# Patient Record
Sex: FEMALE | Race: WHITE | NOT HISPANIC OR LATINO
[De-identification: names, ages, dates, MRNs, and addresses within clinical notes are randomized per-mention and may not be internally consistent; named-entity substitution may affect disease eponyms.]

---

## 2017-09-13 ENCOUNTER — CHARTING TRANS (OUTPATIENT)
Dept: OTHER | Age: 22
End: 2017-09-13

## 2017-09-15 ENCOUNTER — CHARTING TRANS (OUTPATIENT)
Dept: OTHER | Age: 22
End: 2017-09-15

## 2017-09-25 ENCOUNTER — CHARTING TRANS (OUTPATIENT)
Dept: OTHER | Age: 22
End: 2017-09-25

## 2017-09-27 ENCOUNTER — CHARTING TRANS (OUTPATIENT)
Dept: OTHER | Age: 22
End: 2017-09-27

## 2017-10-06 PROBLEM — Z02.89 ENCOUNTER FOR PHYSICAL EXAMINATION RELATED TO EMPLOYMENT: Status: ACTIVE | Noted: 2017-10-06

## 2018-04-30 ENCOUNTER — OFFICE VISIT (OUTPATIENT)
Dept: OBGYN CLINIC | Facility: CLINIC | Age: 23
End: 2018-04-30

## 2018-04-30 VITALS
HEART RATE: 100 BPM | RESPIRATION RATE: 20 BRPM | BODY MASS INDEX: 36.26 KG/M2 | DIASTOLIC BLOOD PRESSURE: 88 MMHG | SYSTOLIC BLOOD PRESSURE: 134 MMHG | TEMPERATURE: 98 F | HEIGHT: 64.25 IN | WEIGHT: 212.38 LBS

## 2018-04-30 DIAGNOSIS — R87.610 ASCUS WITH POSITIVE HIGH RISK HPV CERVICAL: ICD-10-CM

## 2018-04-30 DIAGNOSIS — R87.810 ASCUS WITH POSITIVE HIGH RISK HPV CERVICAL: ICD-10-CM

## 2018-04-30 DIAGNOSIS — Z01.419 ENCOUNTER FOR ANNUAL ROUTINE GYNECOLOGICAL EXAMINATION: Primary | ICD-10-CM

## 2018-04-30 DIAGNOSIS — Z12.4 SCREENING FOR MALIGNANT NEOPLASM OF CERVIX: ICD-10-CM

## 2018-04-30 PROCEDURE — 99385 PREV VISIT NEW AGE 18-39: CPT | Performed by: OBSTETRICS & GYNECOLOGY

## 2018-04-30 PROCEDURE — 88175 CYTOPATH C/V AUTO FLUID REDO: CPT | Performed by: OBSTETRICS & GYNECOLOGY

## 2018-04-30 PROCEDURE — 87624 HPV HI-RISK TYP POOLED RSLT: CPT | Performed by: OBSTETRICS & GYNECOLOGY

## 2018-04-30 RX ORDER — ACETAMINOPHEN AND CODEINE PHOSPHATE 120; 12 MG/5ML; MG/5ML
0.35 SOLUTION ORAL DAILY
Qty: 1 PACKAGE | Refills: 11 | Status: SHIPPED | OUTPATIENT
Start: 2018-04-30 | End: 2018-05-28 | Stop reason: WASHOUT

## 2018-04-30 NOTE — PROGRESS NOTES
HPI:   Emilee Ann is a 21[facility] who presents for an annual gynecological exam.    Menses: Patient's last menstrual period was 04/22/2018 (exact date).  Cycle length: 28 days Flow:  nl   stop birth control pills 2-3 months ago due to running ou denies allergy symptoms    EXAM:     VITALS: /88 (BP Location: Right arm, Patient Position: Sitting, Cuff Size: large)   Pulse 100   Temp 97.9 °F (36.6 °C) (Oral)   Resp 20   Ht 64.25\"   Wt 212 lb 6.4 oz   LMP 04/22/2018 (Exact Date)   Breastfeeding with each sexual encounter  · We discussed prevention of STD's. · We discussed the HPV vaccine including the risks and benefits. · I encouraged smoking cessation, if indicated.   · I encouraged pt to have yearly annual examinations with her PCP for manage

## 2018-09-17 ENCOUNTER — CHARTING TRANS (OUTPATIENT)
Dept: OTHER | Age: 23
End: 2018-09-17

## 2018-09-19 ENCOUNTER — CHARTING TRANS (OUTPATIENT)
Dept: OTHER | Age: 23
End: 2018-09-19

## 2019-03-29 RX ORDER — ACETAMINOPHEN AND CODEINE PHOSPHATE 120; 12 MG/5ML; MG/5ML
0.35 SOLUTION ORAL DAILY
Qty: 1 PACKAGE | Refills: 1 | Status: SHIPPED | OUTPATIENT
Start: 2019-03-29 | End: 2019-04-26 | Stop reason: WASHOUT

## 2019-03-29 NOTE — TELEPHONE ENCOUNTER
Pt contacted to reschedule 4/8 appointment for yearly physical due to it being too soon for insurance to cover. Pt wants a refill for [facility] seeing that she will be running out and cant come in until May.

## 2019-03-29 NOTE — TELEPHONE ENCOUNTER
PC with patient. Needs refill on ocp. Physical due after 4/30/2019. States she checks her Bp periodically at home and she states it has been ok. Physical scheduled for 5/6/2019.

## 2019-04-26 ENCOUNTER — OFFICE VISIT (OUTPATIENT)
Dept: OTHER | Facility: HOSPITAL | Age: 24
End: 2019-04-26
Attending: PREVENTIVE MEDICINE

## 2019-04-26 DIAGNOSIS — Z00.00 ANNUAL PHYSICAL EXAM: Primary | ICD-10-CM

## 2019-04-26 PROCEDURE — 86480 TB TEST CELL IMMUN MEASURE: CPT

## 2019-05-06 ENCOUNTER — OFFICE VISIT (OUTPATIENT)
Dept: OBGYN CLINIC | Facility: CLINIC | Age: 24
End: 2019-05-06
Payer: COMMERCIAL

## 2019-05-06 VITALS
RESPIRATION RATE: 12 BRPM | BODY MASS INDEX: 36.37 KG/M2 | SYSTOLIC BLOOD PRESSURE: 120 MMHG | TEMPERATURE: 98 F | DIASTOLIC BLOOD PRESSURE: 80 MMHG | HEIGHT: 64 IN | WEIGHT: 213 LBS | HEART RATE: 84 BPM

## 2019-05-06 DIAGNOSIS — Z12.4 SCREENING FOR MALIGNANT NEOPLASM OF THE CERVIX: ICD-10-CM

## 2019-05-06 DIAGNOSIS — Z01.419 ENCOUNTER FOR ANNUAL ROUTINE GYNECOLOGICAL EXAMINATION: Primary | ICD-10-CM

## 2019-05-06 PROCEDURE — 99395 PREV VISIT EST AGE 18-39: CPT | Performed by: OBSTETRICS & GYNECOLOGY

## 2019-05-06 PROCEDURE — 88175 CYTOPATH C/V AUTO FLUID REDO: CPT | Performed by: OBSTETRICS & GYNECOLOGY

## 2019-05-06 RX ORDER — ACETAMINOPHEN AND CODEINE PHOSPHATE 120; 12 MG/5ML; MG/5ML
0.35 SOLUTION ORAL DAILY
Qty: 3 PACKAGE | Refills: 3 | Status: SHIPPED | OUTPATIENT
Start: 2019-05-06 | End: 2020-04-06

## 2019-05-06 RX ORDER — ACETAMINOPHEN AND CODEINE PHOSPHATE 120; 12 MG/5ML; MG/5ML
0.35 SOLUTION ORAL
COMMUNITY
End: 2019-05-06

## 2019-05-06 NOTE — PROGRESS NOTES
HPI:   Yumiko Rodriguez is a 25year old  who presents for an annual gynecological exam.    Menses: Patient's last menstrual period was 2019 (approximate). Cycle length: 28 days Flow: light   on [place].   Pap that was done 2018 was AS Size: adult)   Pulse 84   Temp 98.1 °F (36.7 °C) (Oral)   Resp 12   Ht 64\"   Wt 213 lb   LMP 05/01/2019 (Approximate)   Breastfeeding?  No   BMI 36.56 kg/m²   GENERAL:  Well-appearing, no apparent distress, well nourished, well developed  SKIN: Normal, no benefits. · I encouraged smoking cessation, if indicated. · I encouraged pt to have yearly annual examinations with her PCP for management of general medical problems.     Pt voiced understanding of all instructions; all questions answered; no barriers to

## 2019-05-09 RX ORDER — METRONIDAZOLE 500 MG/1
500 TABLET ORAL 2 TIMES DAILY
Qty: 14 TABLET | Refills: 0 | Status: SHIPPED | OUTPATIENT
Start: 2019-05-09 | End: 2019-05-16

## 2020-04-06 RX ORDER — ACETAMINOPHEN AND CODEINE PHOSPHATE 120; 12 MG/5ML; MG/5ML
SOLUTION ORAL
Qty: 84 TABLET | Refills: 0 | Status: SHIPPED | OUTPATIENT
Start: 2020-04-06 | End: 2020-06-26

## 2020-06-16 ENCOUNTER — LAB REQUISITION (OUTPATIENT)
Dept: LAB | Facility: HOSPITAL | Age: 25
End: 2020-06-16
Payer: COMMERCIAL

## 2020-06-16 DIAGNOSIS — Z11.59 ENCOUNTER FOR SCREENING FOR OTHER VIRAL DISEASES: ICD-10-CM

## 2020-06-26 RX ORDER — ACETAMINOPHEN AND CODEINE PHOSPHATE 120; 12 MG/5ML; MG/5ML
SOLUTION ORAL
Qty: 84 TABLET | Refills: 0 | Status: SHIPPED | OUTPATIENT
Start: 2020-06-26 | End: 2020-07-25

## 2020-07-25 ENCOUNTER — OFFICE VISIT (OUTPATIENT)
Dept: OBGYN CLINIC | Facility: CLINIC | Age: 25
End: 2020-07-25
Payer: COMMERCIAL

## 2020-07-25 VITALS
BODY MASS INDEX: 37.73 KG/M2 | TEMPERATURE: 99 F | HEART RATE: 80 BPM | SYSTOLIC BLOOD PRESSURE: 130 MMHG | DIASTOLIC BLOOD PRESSURE: 80 MMHG | HEIGHT: 64 IN | RESPIRATION RATE: 12 BRPM | WEIGHT: 221 LBS

## 2020-07-25 DIAGNOSIS — Z01.419 ENCOUNTER FOR ANNUAL ROUTINE GYNECOLOGICAL EXAMINATION: Primary | ICD-10-CM

## 2020-07-25 DIAGNOSIS — Z12.4 SCREENING FOR MALIGNANT NEOPLASM OF THE CERVIX: ICD-10-CM

## 2020-07-25 PROCEDURE — 3075F SYST BP GE 130 - 139MM HG: CPT | Performed by: OBSTETRICS & GYNECOLOGY

## 2020-07-25 PROCEDURE — 3008F BODY MASS INDEX DOCD: CPT | Performed by: OBSTETRICS & GYNECOLOGY

## 2020-07-25 PROCEDURE — 3079F DIAST BP 80-89 MM HG: CPT | Performed by: OBSTETRICS & GYNECOLOGY

## 2020-07-25 PROCEDURE — 88175 CYTOPATH C/V AUTO FLUID REDO: CPT | Performed by: OBSTETRICS & GYNECOLOGY

## 2020-07-25 PROCEDURE — 99395 PREV VISIT EST AGE 18-39: CPT | Performed by: OBSTETRICS & GYNECOLOGY

## 2020-07-25 RX ORDER — ACETAMINOPHEN AND CODEINE PHOSPHATE 120; 12 MG/5ML; MG/5ML
0.35 SOLUTION ORAL DAILY
Qty: 84 TABLET | Refills: 3 | Status: SHIPPED | OUTPATIENT
Start: 2020-07-25 | End: 2021-09-04

## 2020-07-25 NOTE — PROGRESS NOTES
HPI:   Damari Alon[facility] 22[facility] for an annual [facility]ological exam.    Menses: Patient's last menstrual period was 07/11/2020 (exact date).  Cycle length: 28 days Flow: nl  POP  Current Outpatient Medications   Medication Sig Dispen BMI 37.93 kg/m²   GENERAL:  Well-appearing, no apparent distress, well nourished, well developed  SKIN: Normal, no rashes, no acne, no hirsutism, no ulcers  HEENT: Atraumatic, normocephalic, throat is clear  NECK: No masses, symmetric  THYROID: No masses general medical problems. Pt voiced understanding of all instructions; all questions answered; no barriers to learning.       ASSESSMENT AND PLAN:   Mariah Welch is a 22year old female who presents for an annual gynecological exam.        Normal exam.

## 2020-07-25 NOTE — PROGRESS NOTES
Pt here for pe/pap.   LMP:7/11/20  Last pap:5/6/19 negative   Last mammogram;N/A  Birth control:OCP's  She needs OCP refill

## 2020-08-19 ENCOUNTER — LAB REQUISITION (OUTPATIENT)
Dept: LAB | Facility: HOSPITAL | Age: 25
End: 2020-08-19
Payer: COMMERCIAL

## 2020-08-19 DIAGNOSIS — Z11.59 ENCOUNTER FOR SCREENING FOR OTHER VIRAL DISEASES: ICD-10-CM

## 2020-08-22 LAB — SARS-COV-2 BY PCR: NOT DETECTED

## 2020-08-25 ENCOUNTER — LAB REQUISITION (OUTPATIENT)
Dept: LAB | Facility: HOSPITAL | Age: 25
End: 2020-08-25
Payer: COMMERCIAL

## 2020-08-25 DIAGNOSIS — Z11.59 ENCOUNTER FOR SCREENING FOR OTHER VIRAL DISEASES: ICD-10-CM

## 2020-08-28 LAB — SARS-COV-2 BY PCR: NOT DETECTED

## 2020-09-02 ENCOUNTER — LAB REQUISITION (OUTPATIENT)
Dept: LAB | Facility: HOSPITAL | Age: 25
End: 2020-09-02
Payer: COMMERCIAL

## 2020-09-02 DIAGNOSIS — Z11.59 ENCOUNTER FOR SCREENING FOR OTHER VIRAL DISEASES: ICD-10-CM

## 2020-09-04 LAB — SARS-COV-2 BY PCR: NOT DETECTED

## 2021-08-16 RX ORDER — ACETAMINOPHEN AND CODEINE PHOSPHATE 120; 12 MG/5ML; MG/5ML
SOLUTION ORAL
Qty: 84 TABLET | Refills: 3 | OUTPATIENT
Start: 2021-08-16

## 2021-09-04 ENCOUNTER — OFFICE VISIT (OUTPATIENT)
Dept: OBGYN CLINIC | Facility: CLINIC | Age: 26
End: 2021-09-04
Payer: COMMERCIAL

## 2021-09-04 VITALS
WEIGHT: 215.81 LBS | DIASTOLIC BLOOD PRESSURE: 80 MMHG | HEIGHT: 64 IN | SYSTOLIC BLOOD PRESSURE: 118 MMHG | BODY MASS INDEX: 36.84 KG/M2 | HEART RATE: 92 BPM

## 2021-09-04 DIAGNOSIS — Z01.419 ENCOUNTER FOR ANNUAL ROUTINE GYNECOLOGICAL EXAMINATION: Primary | ICD-10-CM

## 2021-09-04 PROCEDURE — 3074F SYST BP LT 130 MM HG: CPT | Performed by: OBSTETRICS & GYNECOLOGY

## 2021-09-04 PROCEDURE — 88175 CYTOPATH C/V AUTO FLUID REDO: CPT | Performed by: OBSTETRICS & GYNECOLOGY

## 2021-09-04 PROCEDURE — 3008F BODY MASS INDEX DOCD: CPT | Performed by: OBSTETRICS & GYNECOLOGY

## 2021-09-04 PROCEDURE — 3079F DIAST BP 80-89 MM HG: CPT | Performed by: OBSTETRICS & GYNECOLOGY

## 2021-09-04 PROCEDURE — 99395 PREV VISIT EST AGE 18-39: CPT | Performed by: OBSTETRICS & GYNECOLOGY

## 2021-09-04 RX ORDER — ACETAMINOPHEN AND CODEINE PHOSPHATE 120; 12 MG/5ML; MG/5ML
0.35 SOLUTION ORAL DAILY
Qty: 84 TABLET | Refills: 3 | Status: SHIPPED | OUTPATIENT
Start: 2021-09-04

## 2021-09-04 NOTE — PROGRESS NOTES
HPI:   Che Cabrales is a 32year old  who presents for an annual gynecological exam.    Menses: Patient's last menstrual period was 2021 (exact date).  Cycle length: 30 days Flow: nl  POP  Current Outpatient Medications   Medication Sig Dispen 37.04 kg/m²   GENERAL:  Well-appearing, no apparent distress, well nourished, well developed  SKIN: Normal, no rashes, no acne, no hirsutism, no ulcers  HEENT: Atraumatic, normocephalic, throat is clear  NECK: No masses, symmetric  THYROID: No masses, no t barriers to learning. ASSESSMENT AND PLAN:   Maikol Gomez is a 32year old female who presents for an annual gynecological exam.        Normal exam.  Pap done. Contraception: pill  RTO 1 year or PRN.     Diagnoses and all orders for this visit:    En

## 2022-08-04 RX ORDER — ACETAMINOPHEN AND CODEINE PHOSPHATE 120; 12 MG/5ML; MG/5ML
SOLUTION ORAL
Qty: 84 TABLET | Refills: 3 | OUTPATIENT
Start: 2022-08-04

## 2022-08-04 RX ORDER — ACETAMINOPHEN AND CODEINE PHOSPHATE 120; 12 MG/5ML; MG/5ML
0.35 SOLUTION ORAL DAILY
Qty: 84 TABLET | Refills: 0 | Status: SHIPPED | OUTPATIENT
Start: 2022-08-04 | End: 2022-09-07

## 2022-09-07 ENCOUNTER — OFFICE VISIT (OUTPATIENT)
Dept: OBGYN CLINIC | Facility: CLINIC | Age: 27
End: 2022-09-07
Payer: COMMERCIAL

## 2022-09-07 VITALS
DIASTOLIC BLOOD PRESSURE: 83 MMHG | SYSTOLIC BLOOD PRESSURE: 121 MMHG | HEIGHT: 64 IN | HEART RATE: 102 BPM | WEIGHT: 224 LBS | BODY MASS INDEX: 38.24 KG/M2

## 2022-09-07 DIAGNOSIS — Z01.419 ENCOUNTER FOR ANNUAL ROUTINE GYNECOLOGICAL EXAMINATION: Primary | ICD-10-CM

## 2022-09-07 PROCEDURE — 3074F SYST BP LT 130 MM HG: CPT | Performed by: OBSTETRICS & GYNECOLOGY

## 2022-09-07 PROCEDURE — 3008F BODY MASS INDEX DOCD: CPT | Performed by: OBSTETRICS & GYNECOLOGY

## 2022-09-07 PROCEDURE — 3079F DIAST BP 80-89 MM HG: CPT | Performed by: OBSTETRICS & GYNECOLOGY

## 2022-09-07 PROCEDURE — 99395 PREV VISIT EST AGE 18-39: CPT | Performed by: OBSTETRICS & GYNECOLOGY

## 2022-09-07 RX ORDER — ACETAMINOPHEN AND CODEINE PHOSPHATE 120; 12 MG/5ML; MG/5ML
0.35 SOLUTION ORAL DAILY
Qty: 84 TABLET | Refills: 3 | Status: SHIPPED | OUTPATIENT
Start: 2022-09-07

## 2023-09-25 ENCOUNTER — OFFICE VISIT (OUTPATIENT)
Dept: OBGYN CLINIC | Facility: CLINIC | Age: 28
End: 2023-09-25
Payer: COMMERCIAL

## 2023-09-25 VITALS
HEART RATE: 93 BPM | BODY MASS INDEX: 37.63 KG/M2 | HEIGHT: 64 IN | DIASTOLIC BLOOD PRESSURE: 90 MMHG | WEIGHT: 220.44 LBS | SYSTOLIC BLOOD PRESSURE: 126 MMHG

## 2023-09-25 DIAGNOSIS — Z11.3 ROUTINE SCREENING FOR STI (SEXUALLY TRANSMITTED INFECTION): ICD-10-CM

## 2023-09-25 DIAGNOSIS — Z30.41 USES ORAL CONTRACEPTION: ICD-10-CM

## 2023-09-25 DIAGNOSIS — Z23 NEED FOR VACCINATION: ICD-10-CM

## 2023-09-25 DIAGNOSIS — Z01.419 WELL WOMAN EXAM WITH ROUTINE GYNECOLOGICAL EXAM: Primary | ICD-10-CM

## 2023-09-25 PROCEDURE — 87491 CHLMYD TRACH DNA AMP PROBE: CPT

## 2023-09-25 PROCEDURE — 87591 N.GONORRHOEAE DNA AMP PROB: CPT

## 2023-09-25 RX ORDER — ACETAMINOPHEN AND CODEINE PHOSPHATE 120; 12 MG/5ML; MG/5ML
0.35 SOLUTION ORAL DAILY
Qty: 84 TABLET | Refills: 3 | Status: SHIPPED | OUTPATIENT
Start: 2023-09-25

## 2023-09-25 NOTE — PROGRESS NOTES
Mars Baxter is a 29year old female [address] Patient's last menstrual period was 2023 (exact date). Patient presents with:  Wellness Visit: Last pap smear was 21, negative   Contraception: Refill needed, pharmacy confirmed. Other: Patient said YES to student in the room   . BP recheck 122/84  OBSTETRICS HISTORY:  OB History    Para Term  AB Living   1       1 0   SAB IAB Ectopic Multiple Live Births           0      # Outcome Date GA Lbr Raffi/2nd Weight Sex Delivery Anes PTL Lv   1 AB 2013 9w0d              GYNE HISTORY:  Periods regular monthly    Sexual activity:   Yes      Partners:   Male      Birth control/ protection:   Condom, Pill, OCP        Hx Prior Abnormal Pap: No  Pap Date: 21  Pap Result Notes: negative        MEDICAL HISTORY:  Past Medical History:   Diagnosis Date    ASCUS with positive high risk HPV cervical 2016    ASCUS; hpv positive    COVID     10/2020    Human papilloma virus infection        SURGICAL HISTORY:  Past Surgical History:   Procedure Laterality Date    D & c      , 9wks       SOCIAL HISTORY:  Social History    Socioeconomic History      Marital status: Single      Spouse name: Not on file      Number of children: Not on file      Years of education: Not on file      Highest education level: Not on file    Occupational History      Not on file    Tobacco Use      Smoking status: Never      Smokeless tobacco: Never    Vaping Use      Vaping Use: Never used    Substance and Sexual Activity      Alcohol use:  Yes        Alcohol/week: 2.0 standard drinks of alcohol        Types: 2 Glasses of wine per week        Comment: occ drinkers      Drug use: Never      Sexual activity: Yes        Partners: Male        Birth control/protection: Condom, Pill, OCP    Other Topics      Concerns:         Service: Not Asked        Blood Transfusions: Not Asked        Caffeine Concern: Yes        Occupational Exposure: Not Asked        [employer] Hazards: Not Asked        Sleep Concern: Not Asked        Stress Concern: Not Asked        Weight Concern: Not Asked        Special Diet: Not Asked        Back Care: Not Asked        Exercise: Yes        Bike Helmet: Not Asked        Seat Belt: Yes        Self-Exams: Not Asked    Social History Narrative      Not on file    Social Determinants of Health  Financial Resource Strain: Not on file  Food Insecurity: Not on file  Transportation Needs: Not on file  Physical Activity: Not on file  Stress: Not on file  Social Connections: Not on file  Housing Stability: Not on file    FAMILY HISTORY:  Family History   Problem Relation Age of Onset    Other (Other) Mother 27        MS    Cancer Maternal Grandfather 79        adrenal    Cancer Paternal Grandmother 72        breast       MEDICATIONS:    Current Outpatient Medications:     Norethindrone 0.35 MG Oral Tab, Take 1 tablet (0.35 mg total) by mouth daily. , Disp: 84 tablet, Rfl: 3    ALLERGIES:    Penicillins             RASH      Review of Systems:  Constitutional:  Denies fatigue, night sweats, hot flashes  Eyes:  denies blurred or double vision  Cardiovascular:  denies chest pain or palpitations  Respiratory:  denies shortness of breath  Gastrointestinal:  denies heartburn, abdominal pain, diarrhea or constipation  Genitourinary:  denies dysuria, incontinence, abnormal vaginal discharge, vaginal itching  Musculoskeletal:  denies back pain. Skin/Breast:  Denies any breast pain, lumps, or discharge. Neurological:  denies headaches, extremity weakness or numbness. Psychiatric: denies depression or anxiety. Endocrine:   denies excessive thirst or urination. Heme/Lymph:  denies history of anemia, easy bruising or bleeding.       PHYSICAL EXAM:   Constitutional: well developed, well nourished  Head/Face: normocephalic  Neck/Thyroid: thyroid symmetric, no thyromegaly, no nodules, no adenopathy  Lymphatic:no abnormal supraclavicular or axillary adenopathy is noted  Breast: normal without palpable masses, tenderness, asymmetry, nipple discharge, nipple retraction or skin changes  Abdomen:  soft, nontender, nondistended, no masses  Skin/Hair: no unusual rashes or bruises  Extremities: no edema, no cyanosis  Psychiatric:  Oriented to time, place, person and situation. Appropriate mood and affect    Pelvic Exam:  External Genitalia: normal appearance, hair distribution, and no lesions  Urethral Meatus:  normal in size, location, without lesions and prolapse  Bladder:  No fullness, masses or tenderness  Vagina:  Normal appearance without lesions, no abnormal discharge  Cervix:  Normal without tenderness on motion  Uterus: normal in size, contour, position, mobility, without tenderness  Adnexa: normal without masses or tenderness  Perineum: normal  Anus: no hemorroids     Assessment & Plan:  Diagnoses and all orders for this visit:    Well woman exam with routine gynecological exam    Need for vaccination  -     INFLUENZA VACCINE, QUAD, PRESERVATIVE FREE, 0.5 ML    Uses oral contraception  -     Norethindrone 0.35 MG Oral Tab; Take 1 tablet (0.35 mg total) by mouth daily. Routine screening for STI (sexually transmitted infection)  -     Chlamydia/Gc Amplification;  Future

## 2023-09-26 LAB
C TRACH DNA SPEC QL NAA+PROBE: NEGATIVE
N GONORRHOEA DNA SPEC QL NAA+PROBE: NEGATIVE

## 2024-06-11 DIAGNOSIS — Z30.41 USES ORAL CONTRACEPTION: ICD-10-CM

## 2024-06-11 RX ORDER — ACETAMINOPHEN AND CODEINE PHOSPHATE 120; 12 MG/5ML; MG/5ML
0.35 SOLUTION ORAL DAILY
Qty: 84 TABLET | Refills: 0 | Status: SHIPPED | OUTPATIENT
Start: 2024-06-11

## 2024-08-27 DIAGNOSIS — Z30.41 USES ORAL CONTRACEPTION: ICD-10-CM

## 2024-08-27 RX ORDER — ACETAMINOPHEN AND CODEINE PHOSPHATE 120; 12 MG/5ML; MG/5ML
0.35 SOLUTION ORAL DAILY
Qty: 84 TABLET | Refills: 0 | Status: SHIPPED | OUTPATIENT
Start: 2024-08-27 | End: 2024-11-25

## 2024-11-25 ENCOUNTER — OFFICE VISIT (OUTPATIENT)
Dept: OBGYN CLINIC | Facility: CLINIC | Age: 29
End: 2024-11-25
Payer: COMMERCIAL

## 2024-11-25 VITALS
SYSTOLIC BLOOD PRESSURE: 129 MMHG | WEIGHT: 215 LBS | BODY MASS INDEX: 35.82 KG/M2 | HEART RATE: 94 BPM | DIASTOLIC BLOOD PRESSURE: 84 MMHG | HEIGHT: 65 IN

## 2024-11-25 DIAGNOSIS — Z30.41 USES ORAL CONTRACEPTION: ICD-10-CM

## 2024-11-25 DIAGNOSIS — Z01.419 WELL WOMAN EXAM WITH ROUTINE GYNECOLOGICAL EXAM: Primary | ICD-10-CM

## 2024-11-25 DIAGNOSIS — Z12.4 CERVICAL CANCER SCREENING: ICD-10-CM

## 2024-11-25 PROCEDURE — 99459 PELVIC EXAMINATION: CPT

## 2024-11-25 PROCEDURE — 3079F DIAST BP 80-89 MM HG: CPT

## 2024-11-25 PROCEDURE — 3074F SYST BP LT 130 MM HG: CPT

## 2024-11-25 PROCEDURE — 3008F BODY MASS INDEX DOCD: CPT

## 2024-11-25 PROCEDURE — 88175 CYTOPATH C/V AUTO FLUID REDO: CPT

## 2024-11-25 PROCEDURE — 99395 PREV VISIT EST AGE 18-39: CPT

## 2024-11-25 RX ORDER — ACETAMINOPHEN AND CODEINE PHOSPHATE 120; 12 MG/5ML; MG/5ML
0.35 SOLUTION ORAL DAILY
Qty: 84 TABLET | Refills: 3 | Status: SHIPPED | OUTPATIENT
Start: 2024-11-25

## 2024-11-25 NOTE — PROGRESS NOTES
Kary Mathew is a 29 year old female  Patient's last menstrual period was 2024 (exact date).   Chief Complaint   Patient presents with    Wellness Visit     Annual Exam   -Birth Control Refill   .  She is engaged, getting  2025  OBSTETRICS HISTORY:  OB History    Para Term  AB Living   1       1 0   SAB IAB Ectopic Multiple Live Births           0      # Outcome Date GA Lbr Raffi/2nd Weight Sex Type Anes PTL Lv   1 AB  9w0d              GYNE HISTORY:  Periods regular monthly    History   Sexual Activity    Sexual activity: Yes    Partners: Male    Birth control/ protection: Condom, Pill, OCP        Hx Prior Abnormal Pap: Yes  Pap Date: 21  Pap Result Notes: Negative        MEDICAL HISTORY:  Past Medical History:    ASCUS with positive high risk HPV cervical    ASCUS; hpv positive    COVID    10/2020    Human papilloma virus infection       SURGICAL HISTORY:  Past Surgical History:   Procedure Laterality Date    D & c  2013    , 9wks       SOCIAL HISTORY:  Social History     Socioeconomic History    Marital status: Single     Spouse name: Not on file    Number of children: Not on file    Years of education: Not on file    Highest education level: Not on file   Occupational History    Not on file   Tobacco Use    Smoking status: Never    Smokeless tobacco: Never   Vaping Use    Vaping status: Never Used   Substance and Sexual Activity    Alcohol use: Yes     Alcohol/week: 4.0 standard drinks of alcohol     Types: 2 Glasses of wine, 2 Cans of beer per week     Comment: occ drinkers    Drug use: Never    Sexual activity: Yes     Partners: Male     Birth control/protection: Condom, Pill, OCP   Other Topics Concern     Service Not Asked    Blood Transfusions Not Asked    Caffeine Concern Yes     Comment: Coffee in the morning.    Occupational Exposure Not Asked    Hobby Hazards Not Asked    Sleep Concern Not Asked    Stress Concern No    Weight Concern No     Special Diet No    Back Care Not Asked    Exercise Yes     Comment: Walks daily.    Bike Helmet Not Asked    Seat Belt Yes    Self-Exams Not Asked   Social History Narrative    Not on file     Social Drivers of Health     Financial Resource Strain: Not on file   Food Insecurity: Not on file   Transportation Needs: Not on file   Physical Activity: Not on file   Stress: Not on file   Social Connections: Not on file   Housing Stability: Not on file       FAMILY HISTORY:  Family History   Problem Relation Age of Onset    Other (Other) Mother 30        MS    Cancer Maternal Grandfather 70        adrenal    Cancer Paternal Grandmother 65        breast       MEDICATIONS:    Current Outpatient Medications:     Norethindrone 0.35 MG Oral Tab, Take 1 tablet (0.35 mg total) by mouth daily., Disp: 84 tablet, Rfl: 3    ALLERGIES:  Allergies[1]      Review of Systems:  Constitutional:  Denies fatigue, night sweats, hot flashes  Eyes:  denies blurred or double vision  Cardiovascular:  denies chest pain or palpitations  Respiratory:  denies shortness of breath  Gastrointestinal:  denies heartburn, abdominal pain, diarrhea or constipation  Genitourinary:  denies dysuria, incontinence, abnormal vaginal discharge, vaginal itching  Musculoskeletal:  denies back pain.  Skin/Breast:  Denies any breast pain, lumps, or discharge.   Neurological:  denies headaches, extremity weakness or numbness.  Psychiatric: denies depression or anxiety.  Endocrine:   denies excessive thirst or urination.  Heme/Lymph:  denies history of anemia, easy bruising or bleeding.      PHYSICAL EXAM:   Constitutional: well developed, well nourished  Head/Face: normocephalic  Neck/Thyroid: thyroid symmetric, no thyromegaly, no nodules, no adenopathy  Lymphatic:no abnormal supraclavicular or axillary adenopathy is noted  Breast: normal without palpable masses, tenderness, asymmetry, nipple discharge, nipple retraction or skin changes  Abdomen:  soft, nontender,  nondistended, no masses  Skin/Hair: no unusual rashes or bruises  Extremities: no edema, no cyanosis  Psychiatric:  Oriented to time, place, person and situation. Appropriate mood and affect    Pelvic Exam:  External Genitalia: normal appearance, hair distribution, and no lesions  Urethral Meatus:  normal in size, location, without lesions and prolapse  Bladder:  No fullness, masses or tenderness  Vagina:  Normal appearance without lesions, no abnormal discharge  Cervix:  Normal without tenderness on motion  Uterus: normal in size, contour, position, mobility, without tenderness  Adnexa: normal without masses or tenderness  Perineum: normal  Anus: no hemorroids     Assessment & Plan:  Diagnoses and all orders for this visit:    Well woman exam with routine gynecological exam    Uses oral contraception  -     Norethindrone 0.35 MG Oral Tab; Take 1 tablet (0.35 mg total) by mouth daily.    Cervical cancer screening  -     ThinPrep PAP with HPV Reflex Request B; Future                   [1]   Allergies  Allergen Reactions    Penicillins RASH

## 2024-12-05 LAB
.: NORMAL
.: NORMAL

## (undated) NOTE — MR AVS SNAPSHOT
After Visit Summary   7/25/2020    Che Cabrales    MRN: [id number]           Visit Information     Date & Time  7/25/2020 11:00 AM Provider  Kimberly Quintana MD Department  [place] [address], [address]  Phone  [phone]      Your Vit drinks, candies and desserts   Eat plenty of low-fat dairy products High fat meats and dairy   Choose whole grain products Foods high in sodium   Water is best for hydration Fast food.    Eat at home when possible     Tips for increasing your physical activ a treatment plan within a few hours.  ONLINE VISIT  Primary Care Providers  Treatment for mild illness or injury that does not require immediate attention VIDEO VISITS  Average cost  $35*    e-VISTS  Average cost  $35*     SAME DAY APPOINTMENTS   Available

## (undated) NOTE — MR AVS SNAPSHOT
After Visit Summary   9/4/2021    Taylor Bush    MRN: [id number]           Visit Information     Date & Time  9/4/2021  9:15 AM Provider  Shahram Bah MD Department  [facility] [address], [address], [address]  Phone  [phone] non-emergency, consider your options before heading to an ER. VIDEO VISITS  Visit face-to-face with a [facility] physician or   [facility] using your mobile device or computer   using [employer].    e-VISITS  Communicate with a [facility] Physician or [facility]ST[facility] online.  The physician leyla